# Patient Record
(demographics unavailable — no encounter records)

---

## 2020-02-24 NOTE — ED PHYSICIAN DOCUMENTATION
History of Present Illness





- Stated complaint


Stated Complaint: SORE THROAT, BODY ACHES





- Chief complaint


Chief Complaint: Heent





- History obtained from


History obtained from: Patient





- History of Present Illness


Timing: How many days ago (2)





- Additonal information


Additional information: 





26-year-old female works at the [email or web address] [place], states 

sore throat for the past 2 days including subjective fevers.  No nausea or 

vomiting. Worse with swallowing and better with rest.  Patient denies any 

possibility of pregnancy.  She is not breast-feeding.  No cough.  Minimal 

congestion.  No abdominal pain.





Review of Systems


Throat: reports: Sore throat


Respiratory: denies: Cough


GI: denies: Vomiting, Diarrhea


: denies: Now pregnant EGA


Skin: denies: Rash


Musculoskeletal: denies: Neck pain, Back pain


Neurologic: denies: Headache





PD PAST MEDICAL HISTORY





- Past Medical History


Past Medical History: No





- Past Surgical History


Past Surgical History: No





- Present Medications


Home Medications: 


                                Ambulatory Orders











 Medication  Instructions  Recorded  Confirmed


 


Ibuprofen [Motrin] 800 mg PO Q8H PRN #30 tablet 02/24/20 


 


Ondansetron Odt [Zofran] 4 mg TL Q6H PRN #10 tablet 02/24/20 


 


Penicillin V Potassium 500 mg PO Q6HR #40 tablet 02/24/20 














- Allergies


Allergies/Adverse Reactions: 


                                    Allergies











Allergy/AdvReac Type Severity Reaction Status Date / Time


 


No Known Drug Allergies Allergy   Verified 02/24/20 12:22














- Living Situation


Living Situation: reports: With family


Living Arrangement: reports: At home





- Social History


Does the pt smoke?: No


Does the pt drink ETOH?: No


Does the pt have substance abuse?: No





PD ED PE NORMAL





- Vitals


Vital signs reviewed: Yes





- General


General: Alert and oriented X 3, No acute distress, Well developed/nourished





- HEENT


HEENT: Ears normal, Moist mucous membranes, Other (Posterior oropharyngeal 

erythema with tonsillar exudates.  Uvula midline.  Normal phonation.  No 

trismus.)





- Neck


Neck: Supple, no meningeal sign, No adenopathy





- Cardiac


Cardiac: RRR, Strong equal pulses





- Respiratory


Respiratory: No respiratory distress, Clear bilaterally





- Abdomen


Abdomen: Soft, Non tender, Non distended





- Derm


Derm: Warm and dry, No rash





- Neuro


Neuro: Alert and oriented X 3





- Psych


Psych: Normal mood, Normal affect





Results





- Vitals


Vitals: 





                               Vital Signs - 24 hr











  02/24/20





  12:20


 


Temperature 37 C


 


Heart Rate 122 H


 


Respiratory 18





Rate 


 


Blood Pressure 121/78


 


O2 Saturation 100








                                     Oxygen











O2 Source                      Room air

















- Labs


Labs: 





                                Laboratory Tests











  02/24/20





  12:20


 


Group A Strep Rapid  POSITIVE H














PD MEDICAL DECISION MAKING





- ED course


Complexity details: reviewed results, considered differential, d/w patient


ED course: 





Patient with strep pharyngitis.  No evidence of peritonsillar or retropharyngeal

 abscess.  Given dexamethasone here.  Will place on antibiotics and pain 

medication for home.  Patient counseled regarding signs and symptoms for which I

 believe and urgent re-evaluation would be necessary. Patient with good 

understanding of and agreement to plan and is comfortable going home at this 

time





This document was made in part using voice recognition software. While efforts 

are made to proofread this document, sound alike and grammatical errors may 

occur.





Departure





- Departure


Disposition: 01 Home, Self Care


Clinical Impression: 


 Strep pharyngitis





Condition: Good


Instructions:  ED Strep Pharyngitis Conf


Follow-Up: 


Ezekiel Resendiz ARNP [Primary Care Provider] - Within 1 week


(if not better


)


Prescriptions: 


Penicillin V Potassium 500 mg PO Q6HR #40 tablet


Ibuprofen [Motrin] 800 mg PO Q8H PRN #30 tablet


 PRN Reason: PAIN &/OR FEVER


Ondansetron Odt [Zofran] 4 mg TL Q6H PRN #10 tablet


 PRN Reason: Nausea / Vomiting


Comments: 


Take all antibiotics until gone.  Return if you worsen.  This should start to 

improve in the next 24 to 48 hours.


Forms:  Activity restrictions